# Patient Record
Sex: MALE | Race: ASIAN | NOT HISPANIC OR LATINO | URBAN - METROPOLITAN AREA
[De-identification: names, ages, dates, MRNs, and addresses within clinical notes are randomized per-mention and may not be internally consistent; named-entity substitution may affect disease eponyms.]

---

## 2020-09-01 ENCOUNTER — INPATIENT (INPATIENT)
Facility: HOSPITAL | Age: 35
LOS: 6 days | Discharge: HOME | DRG: 206 | End: 2020-09-08
Attending: SURGERY | Admitting: HOSPITALIST
Payer: SELF-PAY

## 2020-09-01 VITALS
WEIGHT: 160.06 LBS | TEMPERATURE: 98 F | RESPIRATION RATE: 18 BRPM | HEART RATE: 61 BPM | OXYGEN SATURATION: 100 % | SYSTOLIC BLOOD PRESSURE: 145 MMHG | HEIGHT: 69 IN | DIASTOLIC BLOOD PRESSURE: 98 MMHG

## 2020-09-01 DIAGNOSIS — S70.02XA CONTUSION OF LEFT HIP, INITIAL ENCOUNTER: ICD-10-CM

## 2020-09-01 LAB
ALBUMIN SERPL ELPH-MCNC: 3.7 G/DL — SIGNIFICANT CHANGE UP (ref 3.3–5)
ALP SERPL-CCNC: 54 U/L — SIGNIFICANT CHANGE UP (ref 40–120)
ALT FLD-CCNC: 40 U/L — SIGNIFICANT CHANGE UP (ref 12–78)
ANION GAP SERPL CALC-SCNC: 10 MMOL/L — SIGNIFICANT CHANGE UP (ref 5–17)
APTT BLD: 26.1 SEC — LOW (ref 27.5–35.5)
AST SERPL-CCNC: 43 U/L — HIGH (ref 15–37)
BASOPHILS # BLD AUTO: 0.11 K/UL — SIGNIFICANT CHANGE UP (ref 0–0.2)
BASOPHILS NFR BLD AUTO: 0.5 % — SIGNIFICANT CHANGE UP (ref 0–2)
BILIRUB SERPL-MCNC: 1.1 MG/DL — SIGNIFICANT CHANGE UP (ref 0.2–1.2)
BUN SERPL-MCNC: 18 MG/DL — SIGNIFICANT CHANGE UP (ref 7–23)
CALCIUM SERPL-MCNC: 8.8 MG/DL — SIGNIFICANT CHANGE UP (ref 8.5–10.1)
CHLORIDE SERPL-SCNC: 107 MMOL/L — SIGNIFICANT CHANGE UP (ref 96–108)
CK SERPL-CCNC: 355 U/L — HIGH (ref 26–308)
CO2 SERPL-SCNC: 23 MMOL/L — SIGNIFICANT CHANGE UP (ref 22–31)
CREAT SERPL-MCNC: 1.3 MG/DL — SIGNIFICANT CHANGE UP (ref 0.5–1.3)
EOSINOPHIL # BLD AUTO: 1.28 K/UL — HIGH (ref 0–0.5)
EOSINOPHIL NFR BLD AUTO: 6.2 % — HIGH (ref 0–6)
ETHANOL SERPL-MCNC: <10 MG/DL — SIGNIFICANT CHANGE UP (ref 0–10)
GLUCOSE SERPL-MCNC: 112 MG/DL — HIGH (ref 70–99)
HCT VFR BLD CALC: 45.7 % — SIGNIFICANT CHANGE UP (ref 39–50)
HGB BLD-MCNC: 14.7 G/DL — SIGNIFICANT CHANGE UP (ref 13–17)
IMM GRANULOCYTES NFR BLD AUTO: 0.5 % — SIGNIFICANT CHANGE UP (ref 0–1.5)
INR BLD: 1.23 RATIO — HIGH (ref 0.88–1.16)
LACTATE SERPL-SCNC: 1.6 MMOL/L — SIGNIFICANT CHANGE UP (ref 0.7–2)
LIDOCAIN IGE QN: 195 U/L — SIGNIFICANT CHANGE UP (ref 73–393)
LYMPHOCYTES # BLD AUTO: 25.7 % — SIGNIFICANT CHANGE UP (ref 13–44)
LYMPHOCYTES # BLD AUTO: 5.34 K/UL — HIGH (ref 1–3.3)
MANUAL SMEAR VERIFICATION: SIGNIFICANT CHANGE UP
MCHC RBC-ENTMCNC: 24.1 PG — LOW (ref 27–34)
MCHC RBC-ENTMCNC: 32.2 GM/DL — SIGNIFICANT CHANGE UP (ref 32–36)
MCV RBC AUTO: 74.9 FL — LOW (ref 80–100)
MICROCYTES BLD QL: SIGNIFICANT CHANGE UP
MONOCYTES # BLD AUTO: 0.97 K/UL — HIGH (ref 0–0.9)
MONOCYTES NFR BLD AUTO: 4.7 % — SIGNIFICANT CHANGE UP (ref 2–14)
NEUTROPHILS # BLD AUTO: 12.96 K/UL — HIGH (ref 1.8–7.4)
NEUTROPHILS NFR BLD AUTO: 62.4 % — SIGNIFICANT CHANGE UP (ref 43–77)
PLAT MORPH BLD: NORMAL — SIGNIFICANT CHANGE UP
PLATELET # BLD AUTO: 323 K/UL — SIGNIFICANT CHANGE UP (ref 150–400)
POTASSIUM SERPL-MCNC: 3.8 MMOL/L — SIGNIFICANT CHANGE UP (ref 3.5–5.3)
POTASSIUM SERPL-SCNC: 3.8 MMOL/L — SIGNIFICANT CHANGE UP (ref 3.5–5.3)
PROT SERPL-MCNC: 7.6 GM/DL — SIGNIFICANT CHANGE UP (ref 6–8.3)
PROTHROM AB SERPL-ACNC: 14.2 SEC — HIGH (ref 10.6–13.6)
RBC # BLD: 6.1 M/UL — HIGH (ref 4.2–5.8)
RBC # FLD: 15.6 % — HIGH (ref 10.3–14.5)
RBC BLD AUTO: SIGNIFICANT CHANGE UP
SODIUM SERPL-SCNC: 140 MMOL/L — SIGNIFICANT CHANGE UP (ref 135–145)
TARGETS BLD QL SMEAR: SLIGHT — SIGNIFICANT CHANGE UP
TROPONIN I SERPL-MCNC: <0.015 NG/ML — SIGNIFICANT CHANGE UP (ref 0.01–0.04)
WBC # BLD: 20.77 K/UL — HIGH (ref 3.8–10.5)
WBC # FLD AUTO: 20.77 K/UL — HIGH (ref 3.8–10.5)

## 2020-09-01 PROCEDURE — 72170 X-RAY EXAM OF PELVIS: CPT | Mod: 26

## 2020-09-01 PROCEDURE — 71260 CT THORAX DX C+: CPT | Mod: 26

## 2020-09-01 PROCEDURE — 90686 IIV4 VACC NO PRSV 0.5 ML IM: CPT

## 2020-09-01 PROCEDURE — 81001 URINALYSIS AUTO W/SCOPE: CPT

## 2020-09-01 PROCEDURE — 83605 ASSAY OF LACTIC ACID: CPT

## 2020-09-01 PROCEDURE — 93010 ELECTROCARDIOGRAM REPORT: CPT

## 2020-09-01 PROCEDURE — 72170 X-RAY EXAM OF PELVIS: CPT

## 2020-09-01 PROCEDURE — 84100 ASSAY OF PHOSPHORUS: CPT

## 2020-09-01 PROCEDURE — C9113: CPT

## 2020-09-01 PROCEDURE — 36415 COLL VENOUS BLD VENIPUNCTURE: CPT

## 2020-09-01 PROCEDURE — 71045 X-RAY EXAM CHEST 1 VIEW: CPT

## 2020-09-01 PROCEDURE — 73502 X-RAY EXAM HIP UNI 2-3 VIEWS: CPT | Mod: 26,LT

## 2020-09-01 PROCEDURE — 73630 X-RAY EXAM OF FOOT: CPT | Mod: RT

## 2020-09-01 PROCEDURE — 73130 X-RAY EXAM OF HAND: CPT | Mod: LT

## 2020-09-01 PROCEDURE — 97116 GAIT TRAINING THERAPY: CPT | Mod: GP

## 2020-09-01 PROCEDURE — 73552 X-RAY EXAM OF FEMUR 2/>: CPT | Mod: LT

## 2020-09-01 PROCEDURE — 97530 THERAPEUTIC ACTIVITIES: CPT | Mod: GP

## 2020-09-01 PROCEDURE — 72125 CT NECK SPINE W/O DYE: CPT | Mod: 26

## 2020-09-01 PROCEDURE — 86769 SARS-COV-2 COVID-19 ANTIBODY: CPT

## 2020-09-01 PROCEDURE — 74177 CT ABD & PELVIS W/CONTRAST: CPT | Mod: 26

## 2020-09-01 PROCEDURE — 73560 X-RAY EXAM OF KNEE 1 OR 2: CPT | Mod: 26,50

## 2020-09-01 PROCEDURE — 73130 X-RAY EXAM OF HAND: CPT | Mod: 26,LT

## 2020-09-01 PROCEDURE — G0008: CPT

## 2020-09-01 PROCEDURE — 85027 COMPLETE CBC AUTOMATED: CPT

## 2020-09-01 PROCEDURE — 73502 X-RAY EXAM HIP UNI 2-3 VIEWS: CPT | Mod: LT

## 2020-09-01 PROCEDURE — 80053 COMPREHEN METABOLIC PANEL: CPT

## 2020-09-01 PROCEDURE — 73560 X-RAY EXAM OF KNEE 1 OR 2: CPT | Mod: 50

## 2020-09-01 PROCEDURE — 80048 BASIC METABOLIC PNL TOTAL CA: CPT

## 2020-09-01 PROCEDURE — 73610 X-RAY EXAM OF ANKLE: CPT | Mod: 26,RT

## 2020-09-01 PROCEDURE — 73610 X-RAY EXAM OF ANKLE: CPT | Mod: RT

## 2020-09-01 PROCEDURE — 73630 X-RAY EXAM OF FOOT: CPT | Mod: 26,RT

## 2020-09-01 PROCEDURE — 70450 CT HEAD/BRAIN W/O DYE: CPT | Mod: 26

## 2020-09-01 PROCEDURE — 73552 X-RAY EXAM OF FEMUR 2/>: CPT | Mod: 26,LT

## 2020-09-01 PROCEDURE — 87040 BLOOD CULTURE FOR BACTERIA: CPT

## 2020-09-01 PROCEDURE — U0003: CPT

## 2020-09-01 PROCEDURE — 71045 X-RAY EXAM CHEST 1 VIEW: CPT | Mod: 26

## 2020-09-01 PROCEDURE — 83735 ASSAY OF MAGNESIUM: CPT

## 2020-09-01 RX ORDER — SODIUM CHLORIDE 9 MG/ML
1000 INJECTION INTRAMUSCULAR; INTRAVENOUS; SUBCUTANEOUS ONCE
Refills: 0 | Status: COMPLETED | OUTPATIENT
Start: 2020-09-01 | End: 2020-09-01

## 2020-09-01 RX ORDER — TETANUS TOXOID, REDUCED DIPHTHERIA TOXOID AND ACELLULAR PERTUSSIS VACCINE, ADSORBED 5; 2.5; 8; 8; 2.5 [IU]/.5ML; [IU]/.5ML; UG/.5ML; UG/.5ML; UG/.5ML
0.5 SUSPENSION INTRAMUSCULAR ONCE
Refills: 0 | Status: COMPLETED | OUTPATIENT
Start: 2020-09-01 | End: 2020-09-01

## 2020-09-01 RX ORDER — CEFAZOLIN SODIUM 1 G
VIAL (EA) INJECTION
Refills: 0 | Status: COMPLETED | OUTPATIENT
Start: 2020-09-01 | End: 2020-09-04

## 2020-09-01 RX ORDER — CEFAZOLIN SODIUM 1 G
2000 VIAL (EA) INJECTION ONCE
Refills: 0 | Status: DISCONTINUED | OUTPATIENT
Start: 2020-09-01 | End: 2020-09-01

## 2020-09-01 RX ORDER — ONDANSETRON 8 MG/1
4 TABLET, FILM COATED ORAL EVERY 8 HOURS
Refills: 0 | Status: DISCONTINUED | OUTPATIENT
Start: 2020-09-01 | End: 2020-09-06

## 2020-09-01 RX ORDER — SODIUM CHLORIDE 9 MG/ML
1000 INJECTION INTRAMUSCULAR; INTRAVENOUS; SUBCUTANEOUS
Refills: 0 | Status: DISCONTINUED | OUTPATIENT
Start: 2020-09-01 | End: 2020-09-07

## 2020-09-01 RX ORDER — OXYCODONE HYDROCHLORIDE 5 MG/1
5 TABLET ORAL EVERY 6 HOURS
Refills: 0 | Status: DISCONTINUED | OUTPATIENT
Start: 2020-09-01 | End: 2020-09-08

## 2020-09-01 RX ORDER — CEFAZOLIN SODIUM 1 G
2000 VIAL (EA) INJECTION ONCE
Refills: 0 | Status: COMPLETED | OUTPATIENT
Start: 2020-09-01 | End: 2020-09-01

## 2020-09-01 RX ORDER — ENOXAPARIN SODIUM 100 MG/ML
40 INJECTION SUBCUTANEOUS DAILY
Refills: 0 | Status: DISCONTINUED | OUTPATIENT
Start: 2020-09-01 | End: 2020-09-08

## 2020-09-01 RX ORDER — PANTOPRAZOLE SODIUM 20 MG/1
40 TABLET, DELAYED RELEASE ORAL DAILY
Refills: 0 | Status: DISCONTINUED | OUTPATIENT
Start: 2020-09-01 | End: 2020-09-08

## 2020-09-01 RX ORDER — HYDROMORPHONE HYDROCHLORIDE 2 MG/ML
1 INJECTION INTRAMUSCULAR; INTRAVENOUS; SUBCUTANEOUS EVERY 4 HOURS
Refills: 0 | Status: DISCONTINUED | OUTPATIENT
Start: 2020-09-01 | End: 2020-09-07

## 2020-09-01 RX ORDER — MORPHINE SULFATE 50 MG/1
4 CAPSULE, EXTENDED RELEASE ORAL ONCE
Refills: 0 | Status: DISCONTINUED | OUTPATIENT
Start: 2020-09-01 | End: 2020-09-01

## 2020-09-01 RX ORDER — CEFAZOLIN SODIUM 1 G
2000 VIAL (EA) INJECTION EVERY 8 HOURS
Refills: 0 | Status: COMPLETED | OUTPATIENT
Start: 2020-09-02 | End: 2020-09-03

## 2020-09-01 RX ORDER — ACETAMINOPHEN 500 MG
1000 TABLET ORAL EVERY 6 HOURS
Refills: 0 | Status: COMPLETED | OUTPATIENT
Start: 2020-09-01 | End: 2020-09-02

## 2020-09-01 RX ADMIN — Medication 100 MILLIGRAM(S): at 20:58

## 2020-09-01 RX ADMIN — MORPHINE SULFATE 4 MILLIGRAM(S): 50 CAPSULE, EXTENDED RELEASE ORAL at 22:14

## 2020-09-01 RX ADMIN — MORPHINE SULFATE 4 MILLIGRAM(S): 50 CAPSULE, EXTENDED RELEASE ORAL at 20:23

## 2020-09-01 RX ADMIN — HYDROMORPHONE HYDROCHLORIDE 1 MILLIGRAM(S): 2 INJECTION INTRAMUSCULAR; INTRAVENOUS; SUBCUTANEOUS at 21:00

## 2020-09-01 RX ADMIN — HYDROMORPHONE HYDROCHLORIDE 1 MILLIGRAM(S): 2 INJECTION INTRAMUSCULAR; INTRAVENOUS; SUBCUTANEOUS at 21:30

## 2020-09-01 RX ADMIN — TETANUS TOXOID, REDUCED DIPHTHERIA TOXOID AND ACELLULAR PERTUSSIS VACCINE, ADSORBED 0.5 MILLILITER(S): 5; 2.5; 8; 8; 2.5 SUSPENSION INTRAMUSCULAR at 21:03

## 2020-09-01 RX ADMIN — SODIUM CHLORIDE 1000 MILLILITER(S): 9 INJECTION INTRAMUSCULAR; INTRAVENOUS; SUBCUTANEOUS at 20:50

## 2020-09-01 RX ADMIN — Medication 2000 MILLIGRAM(S): at 22:05
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
## 2020-09-01 NOTE — H&P ADULT - NSHPPHYSICALEXAM_GEN_ALL_CORE
Vital Signs Last 24 Hrs  T(C): 36.4 (01 Sep 2020 20:07), Max: 36.4 (01 Sep 2020 20:07)  T(F): 97.5 (01 Sep 2020 20:07), Max: 97.5 (01 Sep 2020 20:07)  HR: 61 (01 Sep 2020 20:07) (61 - 61)  BP: 145/98 (01 Sep 2020 20:07) (145/98 - 145/98)  BP(mean): --  RR: 18 (01 Sep 2020 20:07) (18 - 18)  SpO2: 100% (01 Sep 2020 20:07) (100% - 100%)    Secondary Survey:  Gen: Responsive, moderate distress  HEENT: Face intact, no head lacerations, Pupils dilated but reactive bilaterally, no dried blood present in bilateral nares, no hemotympanum  Chest: No gross deformity, Equal chest rise and breathe sounds bilaterally, no audible wheeze or stridor  CV: S1S2 present, no audible murmur  Abd: Soft, non distended, No gross sign of trauma, no tender to palpation  Pelvis: Stable palpable Femoral artery bilateral  Perineum/Genitalia/Rectal: No traumatic injury, rectal tone present, no blood at the meatus  RUE: Arm straight, abrasions to arm, the elbow, and forearm, Full motor and sensory present  LUE: Palpable distal pulses, no gross deformities, minimal abrasions to left shoulder, Left hand   RLE: Arm straight, abrasions to arm,  the elbow, and forearm, Full motor and sensory present, palpable distal pulses  LLE: Palpable distal pulses, Severe pain to palpation of hip and gluteus area, Vital Signs Last 24 Hrs  T(C): 36.4 (01 Sep 2020 20:07), Max: 36.4 (01 Sep 2020 20:07)  T(F): 97.5 (01 Sep 2020 20:07), Max: 97.5 (01 Sep 2020 20:07)  HR: 61 (01 Sep 2020 20:07) (61 - 61)  BP: 145/98 (01 Sep 2020 20:07) (145/98 - 145/98)  BP(mean): --  RR: 18 (01 Sep 2020 20:07) (18 - 18)  SpO2: 100% (01 Sep 2020 20:07) (100% - 100%)    Secondary Survey:  Gen: Responsive, moderate distress  HEENT: Face intact, no head lacerations, Pupils dilated but reactive bilaterally, no dried blood present in bilateral nares, no hemotympanum  Chest: No gross deformity, Equal chest rise and breathe sounds bilaterally, no audible wheeze or stridor  CV: S1S2 present, no audible murmur  Abd: Soft, non distended, No gross sign of trauma, no tender to palpation  Pelvis: Stable palpable Femoral artery bilateral, rectal tone present  Perineum/Genitalia/Rectal: No traumatic injury, rectal tone present, no blood at the meatus  RUE: Arm straight, abrasions to arm, the elbow, and forearm, Full motor and sensory present  LUE: Palpable distal pulses, no gross deformities, minimal abrasions to left shoulder, Left hand   RLE: Arm straight, abrasions to arm,  the elbow, and forearm, Full motor and sensory present, palpable distal pulses  LLE: Palpable distal pulses, Severe pain to palpation of hip and gluteus area, Vital Signs Last 24 Hrs  T(C): 36.4 (01 Sep 2020 20:07), Max: 36.4 (01 Sep 2020 20:07)  T(F): 97.5 (01 Sep 2020 20:07), Max: 97.5 (01 Sep 2020 20:07)  HR: 61 (01 Sep 2020 20:07) (61 - 61)  BP: 145/98 (01 Sep 2020 20:07) (145/98 - 145/98)  BP(mean): --  RR: 18 (01 Sep 2020 20:07) (18 - 18)  SpO2: 100% (01 Sep 2020 20:07) (100% - 100%)    Secondary Survey:  Gen: Responsive, moderate distress  HEENT: Face intact, no head lacerations, Pupils dilated but reactive bilaterally, no dried blood present in bilateral nares, no hemotympanum  Neck: pt in hard collar at time of exam, no tracheal deviation, no crepitus/ecchymosis/hematoma  Chest: No gross deformity, Equal chest rise and breathe sounds bilaterally, no audible wheeze or stridor  CV: S1S2 present, no audible murmur  Abd: Soft, non distended, No gross sign of trauma, no tender to palpation  Pelvis: Stable palpable Femoral artery bilateral, rectal tone present  Perineum/Genitalia/Rectal: No traumatic injury, rectal tone present, no blood at the meatus  RUE: Arm straight, abrasions to arm, the elbow, and forearm, Full motor and sensory present  LUE: Palpable distal pulses, no gross deformities, minimal abrasions to left shoulder, Left hand finger tenderness  RLE: Arm straight, abrasions to arm,  the elbow, and forearm, Full motor and sensory present, palpable distal pulses  LLE: Palpable distal pulses, Severe pain to palpation of hip and gluteus area,
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
## 2020-09-01 NOTE — ED PROVIDER NOTE - MUSCULOSKELETAL, MLM
+Nail avulsion to left 5th digit of hand. +moderate TTP to left hip. neurovascularly intact to all extremities
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
## 2020-09-01 NOTE — H&P ADULT - NSHPLABSRESULTS_GEN_ALL_CORE
CARDIAC MARKERS ( 01 Sep 2020 20:19 )  <0.015 ng/mL / x     / 355 U/L / x     / x                                14.7   20.77 )-----------( 323      ( 01 Sep 2020 20:19 )             45.7     CBC Full  -  ( 01 Sep 2020 20:19 )  WBC Count : 20.77 K/uL  RBC Count : 6.10 M/uL  Hemoglobin : 14.7 g/dL  Hematocrit : 45.7 %  Platelet Count - Automated : 323 K/uL  Mean Cell Volume : 74.9 fl  Mean Cell Hemoglobin : 24.1 pg  Mean Cell Hemoglobin Concentration : 32.2 gm/dL  Auto Neutrophil # : 12.96 K/uL  Auto Lymphocyte # : 5.34 K/uL  Auto Monocyte # : 0.97 K/uL  Auto Eosinophil # : 1.28 K/uL  Auto Basophil # : 0.11 K/uL  Auto Neutrophil % : 62.4 %  Auto Lymphocyte % : 25.7 %  Auto Monocyte % : 4.7 %  Auto Eosinophil % : 6.2 %  Auto Basophil % : 0.5 %    09-01    140  |  107  |  18  ----------------------------<  112<H>  3.8   |  23  |  1.30    Ca    8.8      01 Sep 2020 20:19    TPro  7.6  /  Alb  3.7  /  TBili  1.1  /  DBili  x   /  AST  43<H>  /  ALT  40  /  AlkPhos  54  09-01    LIVER FUNCTIONS - ( 01 Sep 2020 20:19 )  Alb: 3.7 g/dL / Pro: 7.6 gm/dL / ALK PHOS: 54 U/L / ALT: 40 U/L / AST: 43 U/L / GGT: x           PT/INR - ( 01 Sep 2020 20:19 )   PT: 14.2 sec;   INR: 1.23 ratio         PTT - ( 01 Sep 2020 20:19 )  PTT:26.1 sec  Radiology    EXAM:  CT ABDOMEN AND PELVIS IC                          EXAM:  CT CHEST IC                          PROCEDURE DATE:  09/01/2020      INTERPRETATION:  CT CHEST, ABDOMEN AND PELVIS WITH CONTRAST    INDICATION: Trauma.    IMPRESSION:    Small lobulated anterior mediastinal hypodensity measuring 2.6 x 1.1 cm (130:4), likely residual thymic tissue. Differential includes small mediastinal hematoma, likely of venous origin. Correlate with prior studies and follow-up imaging if indicated. No CT findings of thoracic aortic injury. No sternal fracture.    No visceral or vascular traumatic injury to abdomen or pelvis.    EXAM:  CT BRAIN                          EXAM:  CT CERVICAL SPINE                          PROCEDURE DATE:  09/01/2020      INTERPRETATION:  CLINICAL INDICATION: Trauma.    IMPRESSION:  CT head:  -No acute intracranial findings.    CT cervical spine:  -No acute fracture or dislocation.      Pending official extremity xrays
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
## 2020-09-01 NOTE — ED PROVIDER NOTE - CLINICAL SUMMARY MEDICAL DECISION MAKING FREE TEXT BOX
Very difficult to exam as pt moaning nonstop throughout. Given significant mechanism CT head/c-spine/chest/abd/pelvis. Bedside FAST negative, hand consulted for multiple finger avulsions. Surgery recommends admission to their service for pain control and monitoring further care per inpatient team. Very difficult to examine as pt moaning throughout evaluation. Given significant mechanism CT head/c-spine/chest/abd/pelvis. Bedside FAST negative, hand consulted for multiple finger avulsions. Surgery recommends admission to their service for pain control and monitoring. further care per inpatient team.

## 2020-09-01 NOTE — ED PROVIDER NOTE - CONSTITUTIONAL, MLM
normal... Pt obviously uncomfortable and moaning. Awake, alert, oriented to person, place, time/situation.

## 2020-09-01 NOTE — ED ADULT NURSE NOTE - NSIMPLEMENTINTERV_GEN_ALL_ED
Implemented All Fall with Harm Risk Interventions:  [place] to call system. Call bell, personal items and telephone within reach. Instruct patient to call for assistance. Room bathroom lighting operational. Non-slip footwear when patient is off stretcher. Physically safe environment: no spills, clutter or unnecessary equipment. Stretcher in lowest position, wheels locked, appropriate side rails in place. Provide visual cue, wrist band, yellow gown, etc. Monitor gait and stability. Monitor for mental status changes and reorient to person, place, and time. Review medications for side effects contributing to fall risk. Reinforce activity limits and safety measures with patient and family. Provide visual clues: red socks.
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
## 2020-09-02 LAB
ANION GAP SERPL CALC-SCNC: 5 MMOL/L — SIGNIFICANT CHANGE UP (ref 5–17)
APPEARANCE UR: CLEAR — SIGNIFICANT CHANGE UP
BACTERIA # UR AUTO: NEGATIVE — SIGNIFICANT CHANGE UP
BILIRUB UR-MCNC: NEGATIVE — SIGNIFICANT CHANGE UP
BUN SERPL-MCNC: 16 MG/DL — SIGNIFICANT CHANGE UP (ref 7–23)
CALCIUM SERPL-MCNC: 7.7 MG/DL — LOW (ref 8.5–10.1)
CHLORIDE SERPL-SCNC: 110 MMOL/L — HIGH (ref 96–108)
CO2 SERPL-SCNC: 25 MMOL/L — SIGNIFICANT CHANGE UP (ref 22–31)
COLOR SPEC: YELLOW — SIGNIFICANT CHANGE UP
COMMENT - URINE: SIGNIFICANT CHANGE UP
CREAT SERPL-MCNC: 1.1 MG/DL — SIGNIFICANT CHANGE UP (ref 0.5–1.3)
DIFF PNL FLD: ABNORMAL
EPI CELLS # UR: SIGNIFICANT CHANGE UP
GLUCOSE SERPL-MCNC: 107 MG/DL — HIGH (ref 70–99)
GLUCOSE UR QL: NEGATIVE MG/DL — SIGNIFICANT CHANGE UP
HCT VFR BLD CALC: 39.6 % — SIGNIFICANT CHANGE UP (ref 39–50)
HGB BLD-MCNC: 12.7 G/DL — LOW (ref 13–17)
KETONES UR-MCNC: ABNORMAL
LEUKOCYTE ESTERASE UR-ACNC: NEGATIVE — SIGNIFICANT CHANGE UP
MCHC RBC-ENTMCNC: 24.6 PG — LOW (ref 27–34)
MCHC RBC-ENTMCNC: 32.1 GM/DL — SIGNIFICANT CHANGE UP (ref 32–36)
MCV RBC AUTO: 76.6 FL — LOW (ref 80–100)
NITRITE UR-MCNC: NEGATIVE — SIGNIFICANT CHANGE UP
PH UR: 6 — SIGNIFICANT CHANGE UP (ref 5–8)
PLATELET # BLD AUTO: 256 K/UL — SIGNIFICANT CHANGE UP (ref 150–400)
POTASSIUM SERPL-MCNC: 4.4 MMOL/L — SIGNIFICANT CHANGE UP (ref 3.5–5.3)
POTASSIUM SERPL-SCNC: 4.4 MMOL/L — SIGNIFICANT CHANGE UP (ref 3.5–5.3)
PROT UR-MCNC: 30 MG/DL
RBC # BLD: 5.17 M/UL — SIGNIFICANT CHANGE UP (ref 4.2–5.8)
RBC # FLD: 14.6 % — HIGH (ref 10.3–14.5)
RBC CASTS # UR COMP ASSIST: ABNORMAL /HPF (ref 0–4)
SARS-COV-2 RNA SPEC QL NAA+PROBE: SIGNIFICANT CHANGE UP
SODIUM SERPL-SCNC: 140 MMOL/L — SIGNIFICANT CHANGE UP (ref 135–145)
SP GR SPEC: 1.01 — SIGNIFICANT CHANGE UP (ref 1.01–1.02)
UROBILINOGEN FLD QL: NEGATIVE MG/DL — SIGNIFICANT CHANGE UP
WBC # BLD: 22.98 K/UL — HIGH (ref 3.8–10.5)
WBC # FLD AUTO: 22.98 K/UL — HIGH (ref 3.8–10.5)
WBC UR QL: SIGNIFICANT CHANGE UP

## 2020-09-02 PROCEDURE — 71045 X-RAY EXAM CHEST 1 VIEW: CPT | Mod: 26

## 2020-09-02 PROCEDURE — 99233 SBSQ HOSP IP/OBS HIGH 50: CPT

## 2020-09-02 RX ORDER — INFLUENZA VIRUS VACCINE 15; 15; 15; 15 UG/.5ML; UG/.5ML; UG/.5ML; UG/.5ML
0.5 SUSPENSION INTRAMUSCULAR ONCE
Refills: 0 | Status: COMPLETED | OUTPATIENT
Start: 2020-09-02 | End: 2020-09-08

## 2020-09-02 RX ORDER — BACITRACIN ZINC 500 UNIT/G
1 OINTMENT IN PACKET (EA) TOPICAL
Refills: 0 | Status: DISCONTINUED | OUTPATIENT
Start: 2020-09-02 | End: 2020-09-08

## 2020-09-02 RX ORDER — ACETAMINOPHEN 500 MG
650 TABLET ORAL EVERY 6 HOURS
Refills: 0 | Status: DISCONTINUED | OUTPATIENT
Start: 2020-09-02 | End: 2020-09-08

## 2020-09-02 RX ADMIN — Medication 100 MILLIGRAM(S): at 05:31

## 2020-09-02 RX ADMIN — SODIUM CHLORIDE 100 MILLILITER(S): 9 INJECTION INTRAMUSCULAR; INTRAVENOUS; SUBCUTANEOUS at 08:45

## 2020-09-02 RX ADMIN — Medication 1000 MILLIGRAM(S): at 11:31

## 2020-09-02 RX ADMIN — SODIUM CHLORIDE 100 MILLILITER(S): 9 INJECTION INTRAMUSCULAR; INTRAVENOUS; SUBCUTANEOUS at 21:26

## 2020-09-02 RX ADMIN — Medication 1 APPLICATION(S): at 21:26

## 2020-09-02 RX ADMIN — Medication 100 MILLIGRAM(S): at 13:08

## 2020-09-02 RX ADMIN — Medication 400 MILLIGRAM(S): at 08:44

## 2020-09-02 RX ADMIN — Medication 400 MILLIGRAM(S): at 11:30

## 2020-09-02 RX ADMIN — Medication 1000 MILLIGRAM(S): at 02:56

## 2020-09-02 RX ADMIN — Medication 100 MILLIGRAM(S): at 21:25

## 2020-09-02 RX ADMIN — SODIUM CHLORIDE 100 MILLILITER(S): 9 INJECTION INTRAMUSCULAR; INTRAVENOUS; SUBCUTANEOUS at 00:49

## 2020-09-02 RX ADMIN — OXYCODONE HYDROCHLORIDE 5 MILLIGRAM(S): 5 TABLET ORAL at 20:28

## 2020-09-02 RX ADMIN — Medication 1000 MILLIGRAM(S): at 16:33

## 2020-09-02 RX ADMIN — Medication 1 APPLICATION(S): at 11:32

## 2020-09-02 RX ADMIN — ENOXAPARIN SODIUM 40 MILLIGRAM(S): 100 INJECTION SUBCUTANEOUS at 08:49

## 2020-09-02 RX ADMIN — Medication 400 MILLIGRAM(S): at 01:51

## 2020-09-02 RX ADMIN — Medication 1000 MILLIGRAM(S): at 08:49

## 2020-09-02 RX ADMIN — PANTOPRAZOLE SODIUM 40 MILLIGRAM(S): 20 TABLET, DELAYED RELEASE ORAL at 08:49

## 2020-09-02 RX ADMIN — OXYCODONE HYDROCHLORIDE 5 MILLIGRAM(S): 5 TABLET ORAL at 19:24

## 2020-09-02 RX ADMIN — Medication 400 MILLIGRAM(S): at 16:32

## 2020-09-02 RX ADMIN — HYDROMORPHONE HYDROCHLORIDE 1 MILLIGRAM(S): 2 INJECTION INTRAMUSCULAR; INTRAVENOUS; SUBCUTANEOUS at 05:31

## 2020-09-02 RX ADMIN — Medication 650 MILLIGRAM(S): at 23:30
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
## 2020-09-02 NOTE — PATIENT PROFILE ADULT - FOOD INSECURITY
no Skin Substitute Injection Text: The defect edges were debeveled with a #15 scalpel blade.  Given the location of the defect, shape of the defect and the proximity to free margins a skin substitute micronized graft was deemed most appropriate.  The entire vial contents were admixed with 3.0ccs of sterile saline and then injected subcutaneously throughout the entire wound bed.

## 2020-09-02 NOTE — ED ADULT NURSE REASSESSMENT NOTE - NS ED NURSE REASSESS COMMENT FT1
pt. noted resting in stretcher, complains of pain, med given. no distress noted. denies pain/discomfort. safety maintained. WCTM.

## 2020-09-02 NOTE — ED ADULT NURSE REASSESSMENT NOTE - NS ED NURSE REASSESS COMMENT FT1
cervical collar removed as per MD Woody. pt. noted resting comfortably in stretcher, no distress noted. denies pain/discomfort. safety maintained. WCTM.

## 2020-09-02 NOTE — PATIENT PROFILE ADULT - BRADEN SCORE
79 YOWM with dementia with behavioral disturbances and skin rash r/o9 scabies, BIB ER for agitated behavior in NH. Pt state states he guy not know why is he meryl an that he asks staff to help him, they do not and he looses patience. He tried to leave ER, but responded to verbal redirection, [id number] his bed and cooperated for interview.   He admitted to being angry and upset if things do not go his way.   Denies depression, insomnia, appetite problems  SI, HI, AH, VH, PI.
18
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
## 2020-09-02 NOTE — PROGRESS NOTE ADULT - SUBJECTIVE AND OBJECTIVE BOX
Pt was seen and examined this morning at bedside. Pt continues to complain about alot of pain and has some difficulty moving the left hip. Complained that left hand dressing was too tight.    Vital Signs Last 24 Hrs  T(C): 36.7 (02 Sep 2020 08:09), Max: 37.2 (02 Sep 2020 05:32)  T(F): 98.1 (02 Sep 2020 08:09), Max: 98.9 (02 Sep 2020 05:32)  HR: 71 (02 Sep 2020 08:09) (61 - 94)  BP: 116/74 (02 Sep 2020 08:09) (110/66 - 158/102)  BP(mean): 89 (02 Sep 2020 02:00) (81 - 89)  RR: 16 (02 Sep 2020 08:09) (16 - 18)  SpO2: 100% (02 Sep 2020 08:09) (100% - 100%)         Physical Exam:    Gen: Responsive, moderate distress  	HEENT: Face intact, no head lacerations, Pupils dilated but reactive bilaterally, no dried blood present in bilateral nares, no hemotympanum  	Neck: pt in hard collar at time of exam, no tracheal deviation, no crepitus/ecchymosis/hematoma  	Chest: No gross deformity, Equal chest rise and breathe sounds bilaterally, no audible wheeze or stridor  	CV: S1S2 present, no audible murmur  	Abd: Soft, non distended, No gross sign of trauma, no tender to palpation  	Pelvis: Stable palpable Femoral artery bilateral, rectal tone present  	Perineum/Genitalia/Rectal: No traumatic injury, rectal tone present, no blood at the meatus  	RUE: Arm straight, abrasions to arm, the elbow, and forearm, Full motor and sensory present, Dressing in place  	LUE: Palpable distal pulses, no gross deformities, minimal abrasions to left shoulder, Wrapped with ace and has a middle digit splint  	RLE: Arm straight, abrasions to arm,  the elbow, and forearm, Full motor and sensory present, palpable distal pulses, dressing in place on thigh    LLE: Palpable distal pulses, Severe pain to palpation of hip and gluteus area Pt was seen and examined this morning at bedside. Pt continues to complain about alot of pain and has some difficulty moving the left hip. Complained that left hand dressing was too tight.    Vital Signs Last 24 Hrs  T(C): 36.7 (02 Sep 2020 08:09), Max: 37.2 (02 Sep 2020 05:32)  T(F): 98.1 (02 Sep 2020 08:09), Max: 98.9 (02 Sep 2020 05:32)  HR: 71 (02 Sep 2020 08:09) (61 - 94)  BP: 116/74 (02 Sep 2020 08:09) (110/66 - 158/102)  BP(mean): 89 (02 Sep 2020 02:00) (81 - 89)  RR: 16 (02 Sep 2020 08:09) (16 - 18)  SpO2: 100% (02 Sep 2020 08:09) (100% - 100%)         Physical Exam:    Gen: Responsive, moderate distress  	HEENT: Face intact, no head lacerations, Pupils dilated but reactive bilaterally, no dried blood present in bilateral nares, no hemotympanum  	Neck: pt in hard collar at time of exam, no tracheal deviation, no crepitus/ecchymosis/hematoma  	Chest: No gross deformity, Equal chest rise and breathe sounds bilaterally, no audible wheeze or stridor  	CV: S1S2 present, no audible murmur  	Abd: Soft, non distended, No gross sign of trauma, no tender to palpation  	Pelvis: Stable palpable Femoral artery bilateral, rectal tone present  	Perineum/Genitalia/Rectal: No traumatic injury, rectal tone present, no blood at the meatus  	RUE: Arm straight, abrasions to arm, the elbow, and forearm, Full motor and sensory present, Dressing in place  	LUE: Palpable distal pulses, no gross deformities, minimal abrasions to left shoulder, Wrapped with ace and has a middle digit splint  	RLE: Arm straight, abrasions to arm,  the elbow, and forearm, Full motor and sensory present, palpable distal pulses, dressing in place on thigh    LLE: Palpable distal pulses, Severe pain to palpation of hip and gluteus area          Labs:                      12.7<L>                140  | 25   | 16           22.98<H> >-----------< 256     ------------------------< 107<H>                         39.6                 4.4  | 110<H>| 1.10                                                                      Ca 7.7<L> Mg 1.9   Ph 4.1      ,                       14.7                 140  | 23   | 18           20.77<H> >-----------< 323     ------------------------< 112<H>                             45.7                 3.8  | 107  | 1.30                                                                      Ca 8.8   Mg x     Ph x Pt was seen and examined this morning at bedside. Pt continues to complain about alot of pain and has some difficulty moving the left hip. Complained that left hand dressing was too tight. C-Collar was cleared    Vital Signs Last 24 Hrs  T(C): 36.7 (02 Sep 2020 08:09), Max: 37.2 (02 Sep 2020 05:32)  T(F): 98.1 (02 Sep 2020 08:09), Max: 98.9 (02 Sep 2020 05:32)  HR: 71 (02 Sep 2020 08:09) (61 - 94)  BP: 116/74 (02 Sep 2020 08:09) (110/66 - 158/102)  BP(mean): 89 (02 Sep 2020 02:00) (81 - 89)  RR: 16 (02 Sep 2020 08:09) (16 - 18)  SpO2: 100% (02 Sep 2020 08:09) (100% - 100%)         Physical Exam:    Gen: Responsive, moderate distress  	HEENT: Face intact, no head lacerations, Pupils dilated but reactive bilaterally, no dried blood present in bilateral nares, no hemotympanum  	Neck: pt in hard collar at time of exam, no tracheal deviation, no crepitus/ecchymosis/hematoma  	Chest: No gross deformity, Equal chest rise and breathe sounds bilaterally, no audible wheeze or stridor  	CV: S1S2 present, no audible murmur  	Abd: Soft, non distended, No gross sign of trauma, no tender to palpation  	Pelvis: Stable palpable Femoral artery bilateral, rectal tone present  	Perineum/Genitalia/Rectal: No traumatic injury, rectal tone present, no blood at the meatus  	RUE: Arm straight, abrasions to arm, the elbow, and forearm, Full motor and sensory present, Dressing in place  	LUE: Palpable distal pulses, no gross deformities, minimal abrasions to left shoulder, Wrapped with ace and has a middle digit splint  	RLE: Arm straight, abrasions to arm,  the elbow, and forearm, Full motor and sensory present, palpable distal pulses, dressing in place on thigh    LLE: Palpable distal pulses, Severe pain to palpation of hip and gluteus area          Labs:                      12.7<L>                140  | 25   | 16           22.98<H> >-----------< 256     ------------------------< 107<H>                         39.6                 4.4  | 110<H>| 1.10                                                                      Ca 7.7<L> Mg 1.9   Ph 4.1      ,                       14.7                 140  | 23   | 18           20.77<H> >-----------< 323     ------------------------< 112<H>                             45.7                 3.8  | 107  | 1.30                                                                      Ca 8.8   Mg x     Ph x

## 2020-09-03 LAB
ALBUMIN SERPL ELPH-MCNC: 2.7 G/DL — LOW (ref 3.3–5)
ALP SERPL-CCNC: 44 U/L — SIGNIFICANT CHANGE UP (ref 40–120)
ALT FLD-CCNC: 19 U/L — SIGNIFICANT CHANGE UP (ref 12–78)
ANION GAP SERPL CALC-SCNC: 7 MMOL/L — SIGNIFICANT CHANGE UP (ref 5–17)
AST SERPL-CCNC: 23 U/L — SIGNIFICANT CHANGE UP (ref 15–37)
BILIRUB SERPL-MCNC: 1.4 MG/DL — HIGH (ref 0.2–1.2)
BUN SERPL-MCNC: 10 MG/DL — SIGNIFICANT CHANGE UP (ref 7–23)
CALCIUM SERPL-MCNC: 8 MG/DL — LOW (ref 8.5–10.1)
CHLORIDE SERPL-SCNC: 110 MMOL/L — HIGH (ref 96–108)
CO2 SERPL-SCNC: 24 MMOL/L — SIGNIFICANT CHANGE UP (ref 22–31)
CREAT SERPL-MCNC: 0.85 MG/DL — SIGNIFICANT CHANGE UP (ref 0.5–1.3)
GLUCOSE SERPL-MCNC: 87 MG/DL — SIGNIFICANT CHANGE UP (ref 70–99)
HCT VFR BLD CALC: 36.6 % — LOW (ref 39–50)
HGB BLD-MCNC: 12 G/DL — LOW (ref 13–17)
MAGNESIUM SERPL-MCNC: 1.9 MG/DL — SIGNIFICANT CHANGE UP (ref 1.6–2.6)
MCHC RBC-ENTMCNC: 24.3 PG — LOW (ref 27–34)
MCHC RBC-ENTMCNC: 32.8 GM/DL — SIGNIFICANT CHANGE UP (ref 32–36)
MCV RBC AUTO: 74.1 FL — LOW (ref 80–100)
PHOSPHATE SERPL-MCNC: 2.7 MG/DL — SIGNIFICANT CHANGE UP (ref 2.5–4.5)
PLATELET # BLD AUTO: 232 K/UL — SIGNIFICANT CHANGE UP (ref 150–400)
POTASSIUM SERPL-MCNC: 3.6 MMOL/L — SIGNIFICANT CHANGE UP (ref 3.5–5.3)
POTASSIUM SERPL-SCNC: 3.6 MMOL/L — SIGNIFICANT CHANGE UP (ref 3.5–5.3)
PROT SERPL-MCNC: 6.1 GM/DL — SIGNIFICANT CHANGE UP (ref 6–8.3)
RBC # BLD: 4.94 M/UL — SIGNIFICANT CHANGE UP (ref 4.2–5.8)
RBC # FLD: 14.7 % — HIGH (ref 10.3–14.5)
SARS-COV-2 IGG SERPL QL IA: NEGATIVE — SIGNIFICANT CHANGE UP
SARS-COV-2 IGM SERPL IA-ACNC: <3.8 AU/ML — SIGNIFICANT CHANGE UP
SODIUM SERPL-SCNC: 141 MMOL/L — SIGNIFICANT CHANGE UP (ref 135–145)
WBC # BLD: 16.27 K/UL — HIGH (ref 3.8–10.5)
WBC # FLD AUTO: 16.27 K/UL — HIGH (ref 3.8–10.5)

## 2020-09-03 PROCEDURE — 99232 SBSQ HOSP IP/OBS MODERATE 35: CPT

## 2020-09-03 RX ORDER — IBUPROFEN 200 MG
600 TABLET ORAL ONCE
Refills: 0 | Status: COMPLETED | OUTPATIENT
Start: 2020-09-03 | End: 2020-09-03

## 2020-09-03 RX ADMIN — OXYCODONE HYDROCHLORIDE 5 MILLIGRAM(S): 5 TABLET ORAL at 18:30

## 2020-09-03 RX ADMIN — Medication 100 MILLIGRAM(S): at 22:41

## 2020-09-03 RX ADMIN — Medication 600 MILLIGRAM(S): at 02:26

## 2020-09-03 RX ADMIN — ENOXAPARIN SODIUM 40 MILLIGRAM(S): 100 INJECTION SUBCUTANEOUS at 10:32

## 2020-09-03 RX ADMIN — Medication 650 MILLIGRAM(S): at 01:13

## 2020-09-03 RX ADMIN — Medication 100 MILLIGRAM(S): at 05:57

## 2020-09-03 RX ADMIN — PANTOPRAZOLE SODIUM 40 MILLIGRAM(S): 20 TABLET, DELAYED RELEASE ORAL at 10:32

## 2020-09-03 RX ADMIN — Medication 1 APPLICATION(S): at 10:34

## 2020-09-03 RX ADMIN — SODIUM CHLORIDE 100 MILLILITER(S): 9 INJECTION INTRAMUSCULAR; INTRAVENOUS; SUBCUTANEOUS at 15:55

## 2020-09-03 RX ADMIN — Medication 1 APPLICATION(S): at 22:41

## 2020-09-03 RX ADMIN — Medication 100 MILLIGRAM(S): at 13:04

## 2020-09-03 RX ADMIN — HYDROMORPHONE HYDROCHLORIDE 1 MILLIGRAM(S): 2 INJECTION INTRAMUSCULAR; INTRAVENOUS; SUBCUTANEOUS at 22:41
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
## 2020-09-03 NOTE — PROGRESS NOTE ADULT - SUBJECTIVE AND OBJECTIVE BOX
Pt was seen and examined this morning at bedside. Pt continues to complain about alot of pain and has some difficulty moving the left hip. patient had two fevers overnight was given tyelnol and motrin. CXR and UA obtained day prior. Patient continues to be on keflex. Patient denies chills, nausea, vomiting, SOB and CP.     Vital Signs Last 24 Hrs  T(C): 36.6 (03 Sep 2020 07:42), Max: 38.9 (02 Sep 2020 23:10)  T(F): 97.9 (03 Sep 2020 07:42), Max: 102.1 (02 Sep 2020 23:10)  HR: 71 (03 Sep 2020 07:42) (71 - 96)  BP: 123/72 (03 Sep 2020 07:42) (123/72 - 131/76)  BP(mean): --  RR: 17 (03 Sep 2020 07:42) (16 - 17)  SpO2: 100% (03 Sep 2020 07:42) (98% - 100%)       Physical Exam:    Gen: Responsive, moderate distress  	HEENT: Face intact, no head lacerations, Pupils dilated but reactive bilaterally, no dried blood present in bilateral nares, no hemotympanum  	Neck: pt in hard collar at time of exam, no tracheal deviation, no crepitus/ecchymosis/hematoma  	Chest: No gross deformity, Equal chest rise and breathe sounds bilaterally, no audible wheeze or stridor  	CV: S1S2 present, no audible murmur  	Abd: Soft, non distended, No gross sign of trauma, no tender to palpation  	Pelvis: Stable palpable Femoral artery bilateral, rectal tone present  	Perineum/Genitalia/Rectal: No traumatic injury, rectal tone present, no blood at the meatus  	RUE: Arm straight, abrasions to arm, the elbow, and forearm, Full motor and sensory present, Dressing in place  	LUE: Palpable distal pulses, no gross deformities, minimal abrasions to left shoulder, Wrapped with ace and has a middle digit splint  	RLE: Arm straight, abrasions to arm,  the elbow, and forearm, Full motor and sensory present, palpable distal pulses, dressing in place on thigh    LLE: Palpable distal pulses, Severe pain to palpation of hip and gluteus area          Labs:                         12.0   16.27 )-----------( 232      ( 03 Sep 2020 08:51 )             36.6   09-02    140  |  110<H>  |  16  ----------------------------<  107<H>  4.4   |  25  |  1.10    Ca    7.7<L>      02 Sep 2020 07:54  Phos  4.1     09-02  Mg     1.9     09-02    TPro  7.6  /  Alb  3.7  /  TBili  1.1  /  DBili  x   /  AST  43<H>  /  ALT  40  /  AlkPhos  54  09-01  PT/INR - ( 01 Sep 2020 20:19 )   PT: 14.2 sec;   INR: 1.23 ratio         PTT - ( 01 Sep 2020 20:19 )  PTT:26.1 sec

## 2020-09-04 LAB
ALBUMIN SERPL ELPH-MCNC: 2.4 G/DL — LOW (ref 3.3–5)
ALP SERPL-CCNC: 43 U/L — SIGNIFICANT CHANGE UP (ref 40–120)
ALT FLD-CCNC: 17 U/L — SIGNIFICANT CHANGE UP (ref 12–78)
ANION GAP SERPL CALC-SCNC: 6 MMOL/L — SIGNIFICANT CHANGE UP (ref 5–17)
AST SERPL-CCNC: 24 U/L — SIGNIFICANT CHANGE UP (ref 15–37)
BILIRUB SERPL-MCNC: 0.9 MG/DL — SIGNIFICANT CHANGE UP (ref 0.2–1.2)
BUN SERPL-MCNC: 7 MG/DL — SIGNIFICANT CHANGE UP (ref 7–23)
CALCIUM SERPL-MCNC: 8 MG/DL — LOW (ref 8.5–10.1)
CHLORIDE SERPL-SCNC: 109 MMOL/L — HIGH (ref 96–108)
CO2 SERPL-SCNC: 25 MMOL/L — SIGNIFICANT CHANGE UP (ref 22–31)
CREAT SERPL-MCNC: 0.79 MG/DL — SIGNIFICANT CHANGE UP (ref 0.5–1.3)
GLUCOSE SERPL-MCNC: 94 MG/DL — SIGNIFICANT CHANGE UP (ref 70–99)
HCT VFR BLD CALC: 34.9 % — LOW (ref 39–50)
HGB BLD-MCNC: 11.3 G/DL — LOW (ref 13–17)
MAGNESIUM SERPL-MCNC: 1.9 MG/DL — SIGNIFICANT CHANGE UP (ref 1.6–2.6)
MCHC RBC-ENTMCNC: 24.4 PG — LOW (ref 27–34)
MCHC RBC-ENTMCNC: 32.4 GM/DL — SIGNIFICANT CHANGE UP (ref 32–36)
MCV RBC AUTO: 75.4 FL — LOW (ref 80–100)
PHOSPHATE SERPL-MCNC: 2.5 MG/DL — SIGNIFICANT CHANGE UP (ref 2.5–4.5)
PLATELET # BLD AUTO: 235 K/UL — SIGNIFICANT CHANGE UP (ref 150–400)
POTASSIUM SERPL-MCNC: 3.6 MMOL/L — SIGNIFICANT CHANGE UP (ref 3.5–5.3)
POTASSIUM SERPL-SCNC: 3.6 MMOL/L — SIGNIFICANT CHANGE UP (ref 3.5–5.3)
PROT SERPL-MCNC: 6.2 GM/DL — SIGNIFICANT CHANGE UP (ref 6–8.3)
RBC # BLD: 4.63 M/UL — SIGNIFICANT CHANGE UP (ref 4.2–5.8)
RBC # FLD: 14.6 % — HIGH (ref 10.3–14.5)
SODIUM SERPL-SCNC: 140 MMOL/L — SIGNIFICANT CHANGE UP (ref 135–145)
WBC # BLD: 15.22 K/UL — HIGH (ref 3.8–10.5)
WBC # FLD AUTO: 15.22 K/UL — HIGH (ref 3.8–10.5)

## 2020-09-04 PROCEDURE — 99232 SBSQ HOSP IP/OBS MODERATE 35: CPT

## 2020-09-04 RX ADMIN — Medication 1 APPLICATION(S): at 09:39

## 2020-09-04 RX ADMIN — HYDROMORPHONE HYDROCHLORIDE 1 MILLIGRAM(S): 2 INJECTION INTRAMUSCULAR; INTRAVENOUS; SUBCUTANEOUS at 23:00

## 2020-09-04 RX ADMIN — OXYCODONE HYDROCHLORIDE 5 MILLIGRAM(S): 5 TABLET ORAL at 17:27

## 2020-09-04 RX ADMIN — OXYCODONE HYDROCHLORIDE 5 MILLIGRAM(S): 5 TABLET ORAL at 18:27

## 2020-09-04 RX ADMIN — HYDROMORPHONE HYDROCHLORIDE 1 MILLIGRAM(S): 2 INJECTION INTRAMUSCULAR; INTRAVENOUS; SUBCUTANEOUS at 00:00

## 2020-09-04 RX ADMIN — HYDROMORPHONE HYDROCHLORIDE 1 MILLIGRAM(S): 2 INJECTION INTRAMUSCULAR; INTRAVENOUS; SUBCUTANEOUS at 15:57

## 2020-09-04 RX ADMIN — HYDROMORPHONE HYDROCHLORIDE 1 MILLIGRAM(S): 2 INJECTION INTRAMUSCULAR; INTRAVENOUS; SUBCUTANEOUS at 16:12

## 2020-09-04 RX ADMIN — Medication 1 APPLICATION(S): at 23:15

## 2020-09-04 RX ADMIN — HYDROMORPHONE HYDROCHLORIDE 1 MILLIGRAM(S): 2 INJECTION INTRAMUSCULAR; INTRAVENOUS; SUBCUTANEOUS at 22:43

## 2020-09-04 RX ADMIN — HYDROMORPHONE HYDROCHLORIDE 1 MILLIGRAM(S): 2 INJECTION INTRAMUSCULAR; INTRAVENOUS; SUBCUTANEOUS at 08:02

## 2020-09-04 RX ADMIN — HYDROMORPHONE HYDROCHLORIDE 1 MILLIGRAM(S): 2 INJECTION INTRAMUSCULAR; INTRAVENOUS; SUBCUTANEOUS at 07:47

## 2020-09-04 RX ADMIN — ENOXAPARIN SODIUM 40 MILLIGRAM(S): 100 INJECTION SUBCUTANEOUS at 08:28

## 2020-09-04 RX ADMIN — PANTOPRAZOLE SODIUM 40 MILLIGRAM(S): 20 TABLET, DELAYED RELEASE ORAL at 08:28

## 2020-09-04 NOTE — PROGRESS NOTE ADULT - SUBJECTIVE AND OBJECTIVE BOX
Pt was seen and examined this morning at bedside. Pt reports improved pain. No fevers overnight. Still on Ancef. Patient denies chills, nausea, vomiting, SOB and CP.     ICU Vital Signs Last 24 Hrs  T(C): 36.9 (04 Sep 2020 07:39), Max: 37.5 (03 Sep 2020 23:24)  T(F): 98.5 (04 Sep 2020 07:39), Max: 99.5 (03 Sep 2020 23:24)  HR: 84 (04 Sep 2020 07:39) (84 - 101)  BP: 136/80 (04 Sep 2020 07:39) (129/73 - 136/80)  BP(mean): --  ABP: --  ABP(mean): --  RR: 17 (04 Sep 2020 07:39) (17 - 18)  SpO2: 100% (04 Sep 2020 07:39) (100% - 100%)         Physical Exam:    Gen: Responsive, moderate distress  	HEENT: Face intact, no head lacerations, Pupils dilated but reactive bilaterally, no dried blood present in bilateral nares, no hemotympanum  	Neck: pt in hard collar at time of exam, no tracheal deviation, no crepitus/ecchymosis/hematoma  	Chest: No gross deformity, Equal chest rise and breathe sounds bilaterally, no audible wheeze or stridor  	CV: S1S2 present, no audible murmur  	Abd: Soft, non distended, No gross sign of trauma, no tender to palpation  	Pelvis: Stable palpable Femoral artery bilateral, rectal tone present  	Perineum/Genitalia/Rectal: No traumatic injury, rectal tone present, no blood at the meatus  	RUE: Arm straight, abrasions to arm, the elbow, and forearm, Full motor and sensory present, Dressing in place  	LUE: Palpable distal pulses, no gross deformities, minimal abrasions to left shoulder, Wrapped with ace and has a middle digit splint  	RLE: Arm straight, abrasions to arm,  the elbow, and forearm, Full motor and sensory present, palpable distal pulses, dressing in place on thigh    LLE: Palpable distal pulses, Severe pain to palpation of hip and gluteus area                  12.0<L>                141  | 24   | 10           16.27<H> >-----------< 232     ------------------------< 87                    36.6<L>                3.6  | 110<H>| 0.85                                                                      Ca 8.0<L> Mg 1.9   Ph 2.7
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
## 2020-09-05 LAB
ANION GAP SERPL CALC-SCNC: 7 MMOL/L — SIGNIFICANT CHANGE UP (ref 5–17)
BUN SERPL-MCNC: 10 MG/DL — SIGNIFICANT CHANGE UP (ref 7–23)
CALCIUM SERPL-MCNC: 7.7 MG/DL — LOW (ref 8.5–10.1)
CHLORIDE SERPL-SCNC: 111 MMOL/L — HIGH (ref 96–108)
CO2 SERPL-SCNC: 24 MMOL/L — SIGNIFICANT CHANGE UP (ref 22–31)
CREAT SERPL-MCNC: 0.67 MG/DL — SIGNIFICANT CHANGE UP (ref 0.5–1.3)
GLUCOSE SERPL-MCNC: 87 MG/DL — SIGNIFICANT CHANGE UP (ref 70–99)
HCT VFR BLD CALC: 33.8 % — LOW (ref 39–50)
HGB BLD-MCNC: 11.1 G/DL — LOW (ref 13–17)
MAGNESIUM SERPL-MCNC: 1.9 MG/DL — SIGNIFICANT CHANGE UP (ref 1.6–2.6)
MCHC RBC-ENTMCNC: 24.3 PG — LOW (ref 27–34)
MCHC RBC-ENTMCNC: 32.8 GM/DL — SIGNIFICANT CHANGE UP (ref 32–36)
MCV RBC AUTO: 74.1 FL — LOW (ref 80–100)
PHOSPHATE SERPL-MCNC: 3.8 MG/DL — SIGNIFICANT CHANGE UP (ref 2.5–4.5)
PLATELET # BLD AUTO: 266 K/UL — SIGNIFICANT CHANGE UP (ref 150–400)
POTASSIUM SERPL-MCNC: 3.4 MMOL/L — LOW (ref 3.5–5.3)
POTASSIUM SERPL-SCNC: 3.4 MMOL/L — LOW (ref 3.5–5.3)
RBC # BLD: 4.56 M/UL — SIGNIFICANT CHANGE UP (ref 4.2–5.8)
RBC # FLD: 14.6 % — HIGH (ref 10.3–14.5)
SODIUM SERPL-SCNC: 142 MMOL/L — SIGNIFICANT CHANGE UP (ref 135–145)
WBC # BLD: 12.69 K/UL — HIGH (ref 3.8–10.5)
WBC # FLD AUTO: 12.69 K/UL — HIGH (ref 3.8–10.5)

## 2020-09-05 PROCEDURE — 99232 SBSQ HOSP IP/OBS MODERATE 35: CPT

## 2020-09-05 RX ADMIN — SODIUM CHLORIDE 100 MILLILITER(S): 9 INJECTION INTRAMUSCULAR; INTRAVENOUS; SUBCUTANEOUS at 05:51

## 2020-09-05 RX ADMIN — ENOXAPARIN SODIUM 40 MILLIGRAM(S): 100 INJECTION SUBCUTANEOUS at 09:27

## 2020-09-05 RX ADMIN — Medication 1 APPLICATION(S): at 22:16

## 2020-09-05 RX ADMIN — OXYCODONE HYDROCHLORIDE 5 MILLIGRAM(S): 5 TABLET ORAL at 22:15

## 2020-09-05 RX ADMIN — Medication 650 MILLIGRAM(S): at 05:52

## 2020-09-05 RX ADMIN — HYDROMORPHONE HYDROCHLORIDE 1 MILLIGRAM(S): 2 INJECTION INTRAMUSCULAR; INTRAVENOUS; SUBCUTANEOUS at 16:05

## 2020-09-05 RX ADMIN — PANTOPRAZOLE SODIUM 40 MILLIGRAM(S): 20 TABLET, DELAYED RELEASE ORAL at 09:28

## 2020-09-05 RX ADMIN — Medication 1 APPLICATION(S): at 14:58

## 2020-09-06 PROCEDURE — 99231 SBSQ HOSP IP/OBS SF/LOW 25: CPT

## 2020-09-06 RX ORDER — ONDANSETRON 8 MG/1
4 TABLET, FILM COATED ORAL EVERY 8 HOURS
Refills: 0 | Status: DISCONTINUED | OUTPATIENT
Start: 2020-09-06 | End: 2020-09-08

## 2020-09-06 RX ADMIN — PANTOPRAZOLE SODIUM 40 MILLIGRAM(S): 20 TABLET, DELAYED RELEASE ORAL at 09:34

## 2020-09-06 RX ADMIN — OXYCODONE HYDROCHLORIDE 5 MILLIGRAM(S): 5 TABLET ORAL at 11:55

## 2020-09-06 RX ADMIN — Medication 1 APPLICATION(S): at 09:32

## 2020-09-06 RX ADMIN — ENOXAPARIN SODIUM 40 MILLIGRAM(S): 100 INJECTION SUBCUTANEOUS at 09:34

## 2020-09-06 RX ADMIN — SODIUM CHLORIDE 100 MILLILITER(S): 9 INJECTION INTRAMUSCULAR; INTRAVENOUS; SUBCUTANEOUS at 23:02

## 2020-09-06 RX ADMIN — OXYCODONE HYDROCHLORIDE 5 MILLIGRAM(S): 5 TABLET ORAL at 21:57

## 2020-09-06 RX ADMIN — OXYCODONE HYDROCHLORIDE 5 MILLIGRAM(S): 5 TABLET ORAL at 09:33

## 2020-09-06 RX ADMIN — Medication 1 APPLICATION(S): at 22:00

## 2020-09-06 RX ADMIN — SODIUM CHLORIDE 100 MILLILITER(S): 9 INJECTION INTRAMUSCULAR; INTRAVENOUS; SUBCUTANEOUS at 01:58

## 2020-09-07 PROCEDURE — 99231 SBSQ HOSP IP/OBS SF/LOW 25: CPT

## 2020-09-07 RX ADMIN — PANTOPRAZOLE SODIUM 40 MILLIGRAM(S): 20 TABLET, DELAYED RELEASE ORAL at 10:10

## 2020-09-07 RX ADMIN — Medication 1 APPLICATION(S): at 22:48

## 2020-09-07 RX ADMIN — Medication 1 APPLICATION(S): at 10:11

## 2020-09-07 RX ADMIN — HYDROMORPHONE HYDROCHLORIDE 1 MILLIGRAM(S): 2 INJECTION INTRAMUSCULAR; INTRAVENOUS; SUBCUTANEOUS at 00:18

## 2020-09-07 RX ADMIN — OXYCODONE HYDROCHLORIDE 5 MILLIGRAM(S): 5 TABLET ORAL at 23:53

## 2020-09-07 RX ADMIN — ENOXAPARIN SODIUM 40 MILLIGRAM(S): 100 INJECTION SUBCUTANEOUS at 10:10
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
## 2020-09-08 VITALS
DIASTOLIC BLOOD PRESSURE: 80 MMHG | RESPIRATION RATE: 17 BRPM | TEMPERATURE: 98 F | OXYGEN SATURATION: 100 % | HEART RATE: 76 BPM | SYSTOLIC BLOOD PRESSURE: 131 MMHG

## 2020-09-08 LAB
CULTURE RESULTS: SIGNIFICANT CHANGE UP
CULTURE RESULTS: SIGNIFICANT CHANGE UP
SPECIMEN SOURCE: SIGNIFICANT CHANGE UP
SPECIMEN SOURCE: SIGNIFICANT CHANGE UP

## 2020-09-08 PROCEDURE — 99239 HOSP IP/OBS DSCHRG MGMT >30: CPT

## 2020-09-08 RX ORDER — OXYCODONE HYDROCHLORIDE 5 MG/1
1 TABLET ORAL
Qty: 16 | Refills: 0
Start: 2020-09-08 | End: 2020-09-11

## 2020-09-08 RX ORDER — BACITRACIN ZINC 500 UNIT/G
1 OINTMENT IN PACKET (EA) TOPICAL
Qty: 0 | Refills: 0 | DISCHARGE
Start: 2020-09-08

## 2020-09-08 RX ORDER — CEPHALEXIN 500 MG
1 CAPSULE ORAL
Qty: 40 | Refills: 0
Start: 2020-09-08 | End: 2020-09-17

## 2020-09-08 RX ADMIN — OXYCODONE HYDROCHLORIDE 5 MILLIGRAM(S): 5 TABLET ORAL at 10:50

## 2020-09-08 RX ADMIN — PANTOPRAZOLE SODIUM 40 MILLIGRAM(S): 20 TABLET, DELAYED RELEASE ORAL at 09:47

## 2020-09-08 RX ADMIN — OXYCODONE HYDROCHLORIDE 5 MILLIGRAM(S): 5 TABLET ORAL at 00:23

## 2020-09-08 RX ADMIN — INFLUENZA VIRUS VACCINE 0.5 MILLILITER(S): 15; 15; 15; 15 SUSPENSION INTRAMUSCULAR at 09:59

## 2020-09-08 RX ADMIN — Medication 1 APPLICATION(S): at 09:47

## 2020-09-08 RX ADMIN — OXYCODONE HYDROCHLORIDE 5 MILLIGRAM(S): 5 TABLET ORAL at 09:54

## 2020-09-08 RX ADMIN — ENOXAPARIN SODIUM 40 MILLIGRAM(S): 100 INJECTION SUBCUTANEOUS at 09:47
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
## 2020-09-08 NOTE — DISCHARGE NOTE PROVIDER - CARE PROVIDER_API CALL
[facility] for wound healing,   [address] [address]  Phone: [phone]  Fax: (   )    -  Follow Up Time: 1-3 days    Primary Medical Doctor,   Phone: (   )    -  Fax: (   )    -  Follow Up Time: 1-3 days [facility] for wound healing,   [address] [place] [address]  Phone: [phone]  Fax: (   )    -  Follow Up Time: 1-3 days    Primary Medical Doctor,   Phone: (   )    -  Fax: (   )    -  Follow Up Time: 1-3 days    Raúl Faulkner  ORTHOPAEDIC SURGERY  [address] [place] [address]  Phone: [phone]  Fax: [phone]  Follow Up Time: 1 week

## 2020-09-08 NOTE — DISCHARGE NOTE PROVIDER - PROVIDER TOKENS
FREE:[LAST:[[facility] for wound healing],PHONE:[phone]],FAX:[(   )    -],ADDRESS:[[address] [place] [address]],FOLLOWUP:[1-3 days]],FREE:[LAST:[[facility]],PHONE:[(   )    -],FAX:[(   )    -],FOLLOWUP:[1-3 days]] FREE:[LAST:[[facility] for wound healing],PHONE:[phone]],FAX:[(   )    -],ADDRESS:[[employer] [place] [address]],FOLLOWUP:[1-3 days]],FREE:[LAST:[Primary Medical Doctor],PHONE:[(   )    -],FAX:[(   )    -],FOLLOWUP:[1-3 days]],PROVIDER:[TOKEN:[[id number]],FOLLOWUP:[1 week]]
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
## 2020-09-08 NOTE — DISCHARGE NOTE PROVIDER - NSDCHHHOMEBOUNDOTHER_GEN_ALL_CORE_FT
Daily VNS for right extremities, normal saline irrigation, bacitracin application, non adhesive dressing overlay

## 2020-09-08 NOTE — DISCHARGE NOTE NURSING/CASE MANAGEMENT/SOCIAL WORK - PATIENT PORTAL LINK FT
You can access the FollowMyHealth Patient Portal offered by [facility] by registering at the following website: [email or web address]. By joining [facility]’s FollowMyHealth portal, you will also be able to view your health information using other applications (apps) compatible with our system.
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
## 2020-09-15 DIAGNOSIS — S27.892A CONTUSION OF OTHER SPECIFIED INTRATHORACIC ORGANS, INITIAL ENCOUNTER: ICD-10-CM

## 2020-09-15 DIAGNOSIS — L03.90 CELLULITIS, UNSPECIFIED: ICD-10-CM

## 2020-09-15 DIAGNOSIS — S61.307A UNSPECIFIED OPEN WOUND OF LEFT LITTLE FINGER WITH DAMAGE TO NAIL, INITIAL ENCOUNTER: ICD-10-CM

## 2020-09-15 DIAGNOSIS — S62.603A FRACTURE OF UNSPECIFIED PHALANX OF LEFT MIDDLE FINGER, INITIAL ENCOUNTER FOR CLOSED FRACTURE: ICD-10-CM

## 2020-09-15 DIAGNOSIS — S70.02XA CONTUSION OF LEFT HIP, INITIAL ENCOUNTER: ICD-10-CM
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
## 2020-09-15 DIAGNOSIS — S30.0XXA CONTUSION OF LOWER BACK AND PELVIS, INITIAL ENCOUNTER: ICD-10-CM

## 2020-09-15 DIAGNOSIS — M54.5 LOW BACK PAIN: ICD-10-CM

## 2020-09-15 DIAGNOSIS — S40.811A ABRASION OF RIGHT UPPER ARM, INITIAL ENCOUNTER: ICD-10-CM

## 2020-09-15 DIAGNOSIS — D72.829 ELEVATED WHITE BLOOD CELL COUNT, UNSPECIFIED: ICD-10-CM

## 2020-09-15 DIAGNOSIS — S80.811A ABRASION, RIGHT LOWER LEG, INITIAL ENCOUNTER: ICD-10-CM

## 2023-03-02 NOTE — DISCHARGE NOTE PROVIDER - NSDCMRMEDTOKEN_GEN_ALL_CORE_FT
Unable to Assess bacitracin 500 units/g topical ointment: 1 application topically 2 times a day  cephalexin 500 mg oral tablet: 1 tab(s) orally every 6 hours   oxyCODONE 5 mg oral tablet: 1 tab(s) orally every 6 hours, As Needed -for moderate pain MDD:4 tabs